# Patient Record
Sex: FEMALE | Race: OTHER | NOT HISPANIC OR LATINO | URBAN - METROPOLITAN AREA
[De-identification: names, ages, dates, MRNs, and addresses within clinical notes are randomized per-mention and may not be internally consistent; named-entity substitution may affect disease eponyms.]

---

## 2017-10-03 ENCOUNTER — EMERGENCY (EMERGENCY)
Facility: HOSPITAL | Age: 37
LOS: 1 days | Discharge: HOME | End: 2017-10-03
Attending: EMERGENCY MEDICINE | Admitting: EMERGENCY MEDICINE
Payer: SELF-PAY

## 2017-10-03 VITALS
WEIGHT: 190.04 LBS | HEART RATE: 73 BPM | OXYGEN SATURATION: 100 % | TEMPERATURE: 98 F | SYSTOLIC BLOOD PRESSURE: 120 MMHG | DIASTOLIC BLOOD PRESSURE: 79 MMHG | RESPIRATION RATE: 17 BRPM

## 2017-10-03 VITALS
TEMPERATURE: 98 F | HEART RATE: 67 BPM | RESPIRATION RATE: 18 BRPM | OXYGEN SATURATION: 99 % | SYSTOLIC BLOOD PRESSURE: 118 MMHG | DIASTOLIC BLOOD PRESSURE: 78 MMHG

## 2017-10-03 DIAGNOSIS — M54.5 LOW BACK PAIN: ICD-10-CM

## 2017-10-03 PROCEDURE — 99283 EMERGENCY DEPT VISIT LOW MDM: CPT | Mod: 25

## 2017-10-03 PROCEDURE — 99284 EMERGENCY DEPT VISIT MOD MDM: CPT

## 2017-10-03 PROCEDURE — 96372 THER/PROPH/DIAG INJ SC/IM: CPT

## 2017-10-03 RX ORDER — KETOROLAC TROMETHAMINE 30 MG/ML
30 SYRINGE (ML) INJECTION ONCE
Qty: 0 | Refills: 0 | Status: DISCONTINUED | OUTPATIENT
Start: 2017-10-03 | End: 2017-10-03

## 2017-10-03 RX ORDER — CYCLOBENZAPRINE HYDROCHLORIDE 10 MG/1
10 TABLET, FILM COATED ORAL ONCE
Qty: 0 | Refills: 0 | Status: COMPLETED | OUTPATIENT
Start: 2017-10-03 | End: 2017-10-03

## 2017-10-03 RX ORDER — IBUPROFEN 200 MG
1 TABLET ORAL
Qty: 20 | Refills: 0 | OUTPATIENT
Start: 2017-10-03 | End: 2017-10-08

## 2017-10-03 RX ORDER — CYCLOBENZAPRINE HYDROCHLORIDE 10 MG/1
1 TABLET, FILM COATED ORAL
Qty: 5 | Refills: 0 | OUTPATIENT
Start: 2017-10-03 | End: 2017-10-08

## 2017-10-03 RX ADMIN — Medication 30 MILLIGRAM(S): at 14:37

## 2017-10-03 RX ADMIN — CYCLOBENZAPRINE HYDROCHLORIDE 10 MILLIGRAM(S): 10 TABLET, FILM COATED ORAL at 12:57

## 2017-10-03 RX ADMIN — Medication 30 MILLIGRAM(S): at 13:47

## 2017-10-03 NOTE — ED PROVIDER NOTE - MEDICAL DECISION MAKING DETAILS
37 y/o female with atraumatic back pain. Reproducible with movement, neurovascularly intact with no saddle anesthesia, loss of urine/bowel incontinence. Pain alleviated with toradol and cyclobenzaprine. Follow up with PMD 37 y/o female with atraumatic back pain. Reproducible with movement, neurovascularly intact with no saddle anesthesia, and no loss of urine/bowel incontinence. Pain alleviated with toradol and cyclobenzaprine. Follow up with PMD

## 2017-10-03 NOTE — ED ADULT NURSE NOTE - OBJECTIVE STATEMENT
Patient c/o chronic continuous back pain 8/10, no numbness/tingling, no neuro deficits.  Denies Hx. of back injury.

## 2017-10-03 NOTE — ED PROVIDER NOTE - OBJECTIVE STATEMENT
35 y/o female with no PMHx is present with atraumatic back pain x2 days. Pt states that it is located 37 y/o female with no PMHx is present with atraumatic back pain x2 days. Pt states that it is located b/l across her lower back. She felt the back while she was resting and reports an aching like pain without radiation to either extremity. Pt reports she took 3 tylenol over the past 2 days, but her pain has not subsided. She denies the following: fever, chills, recent spinal taps, sob, chest pain, abdominal pain, saddle anesthesia, loss of urine or bowel movement, tingling or radiation down either extremity.

## 2017-10-03 NOTE — ED ADULT NURSE NOTE - CHPI ED SYMPTOMS NEG
no decreased eating/drinking/no vomiting/no tingling/no weakness/no fever/no chills/no dizziness/no nausea/no numbness

## 2017-10-03 NOTE — ED PROVIDER NOTE - CARE PLAN
Principal Discharge DX:	Back pain  Instructions for follow-up, activity and diet:	Please follow up with PMD if worsening pain

## 2018-01-01 NOTE — ED PROVIDER NOTE - NS ED MD DISPO DISCHARGE CCDA
wic paper sent mom was headed into office. Patient/Caregiver provided printed discharge information.